# Patient Record
Sex: MALE | Race: WHITE | Employment: FULL TIME | URBAN - METROPOLITAN AREA
[De-identification: names, ages, dates, MRNs, and addresses within clinical notes are randomized per-mention and may not be internally consistent; named-entity substitution may affect disease eponyms.]

---

## 2019-07-29 ENCOUNTER — HISTORICAL (OUTPATIENT)
Dept: ADMINISTRATIVE | Facility: HOSPITAL | Age: 24
End: 2019-07-29

## 2019-08-02 ENCOUNTER — HISTORICAL (OUTPATIENT)
Dept: SURGERY | Facility: HOSPITAL | Age: 24
End: 2019-08-02

## 2022-04-10 ENCOUNTER — HISTORICAL (OUTPATIENT)
Dept: ADMINISTRATIVE | Facility: HOSPITAL | Age: 27
End: 2022-04-10

## 2022-04-28 VITALS
SYSTOLIC BLOOD PRESSURE: 143 MMHG | BODY MASS INDEX: 33.03 KG/M2 | WEIGHT: 265.63 LBS | OXYGEN SATURATION: 96 % | DIASTOLIC BLOOD PRESSURE: 70 MMHG | HEIGHT: 75 IN

## 2022-04-30 NOTE — OP NOTE
Patient:   Earl Cooney            MRN: [id number]            FIN: [id number]               Age:   24 years     Sex:  Male     :  1995   Associated Diagnoses:   None   Author:   Gisele MORENO MD, Joey BARTON      SURGEON: Joey Jaquez MD    PREOPERATIVE DIAGNOSIS:   Right knee medial meniscus tear, right knee lateral femoral condyle OCD     POSTOPERATIVE DIAGNOSIS:   Right knee medial meniscus tear, right lateral femoral condyle OCDstable    PROCEDURE PERFORMED:  1.  Right knee arthroscopic partial medial meniscectomy    ESTIMATED BLOOD LOSS: 10cc.    COMPLICATIONS: None.    TOURNIQUET TIME: ~30 minutes.    ANTIBIOTICS: Ancef     INDICATIONS FOR PROCEDURE: Earl is a 24y.o. male who has had ongoing right knee pain. The patient has had to limit activity due to intermittent pain & occasional mechanical symptoms. An MRI was performed that showed a meniscus tear that I felt would be amenable to arthroscopic debridement. The patient decided to opt for surgery after failing conservative management.    OPERATIVE REPORT: Earl was initially seen in the preoperative holding area where history and physical were reviewed without change. The operative leg was marked, consents were reviewed, and any questions were answered for the patient and family.The patient was then taken back to the operating room, placed supine on the operating table with all bony prominences padded. Then a nonsterile tourniquet was placed around the upper thigh. The patient was induced under general anesthesia.The operative lower extremity was prepped and draped in standard sterile fashion. A timeout led by the surgeon was performed, and preoperative antibiotics were given. The limp was exsanguinated with gravity. The tourniquet was raised to 100 mmHg over the systolic blood pressure.    The knee was then flexed and the inferolateral portal was created with an #11 blade scalpel.    The camera was introduced, using the blunt trocar, into  the suprapatellar pouch. The undersurface of the patella was found to have no chondral wear and the trochlear groove was found to have no chondral wear . The camera was then taken down into the lateral gutter, where no loose bodies and no plica were found. The camera was then brought into the medial gutter, where no loose bodies and no plica were seen. The camera was then brought into the medial compartment.    An inferomedial portal was then localized with a spinal needle, and created using an #11 blade. The medial meniscus was probed and found to have a bucket-handle tear. A combination of baskets and loree were used to debride the meniscal flap down to a stable margin of approximately 30 percent remaining and then shaver was used to smooth the edges. The medial femoral condyle was found to have no wear. The medial tibial plateau demonstrated no wear.    The camera was then turned to the notch, where the ACL graft was found to be intact.    Then the leg was brought into figure-of-four position. The camera was brought into the lateral compartment. The lateral meniscus was probed and found to have no tears. The lateral femoral condyle was found to have a stable osteochondral lesion. The lateral tibial plateau had no chondral wear.    The knee was once more examined for loose bodies, of which none were found. The knee was then evacuated of all fluids. The portals were closed with 3-0 monocyrl interrupted sutures and steristrips. 10mL of 0.25% Bupivicaine were infiltrated around each portal. A sterile dressing was placed, and the tourniquet was deflated after ~30 minutes. The patient was awakened from anesthesia and taken to the postoperative care unit in stable condition.

## 2023-04-20 DIAGNOSIS — R74.01 ELEVATION OF LEVELS OF LIVER TRANSAMINASE LEVELS: Primary | ICD-10-CM
